# Patient Record
Sex: FEMALE | Race: WHITE | NOT HISPANIC OR LATINO | Employment: PART TIME | URBAN - METROPOLITAN AREA
[De-identification: names, ages, dates, MRNs, and addresses within clinical notes are randomized per-mention and may not be internally consistent; named-entity substitution may affect disease eponyms.]

---

## 2024-02-23 ENCOUNTER — OFFICE VISIT (OUTPATIENT)
Dept: ORTHOPEDIC SURGERY | Facility: CLINIC | Age: 74
End: 2024-02-23
Payer: MEDICARE

## 2024-02-23 VITALS
WEIGHT: 158.6 LBS | BODY MASS INDEX: 33.29 KG/M2 | SYSTOLIC BLOOD PRESSURE: 148 MMHG | DIASTOLIC BLOOD PRESSURE: 86 MMHG | HEIGHT: 58 IN

## 2024-02-23 DIAGNOSIS — M25.561 RIGHT KNEE PAIN, UNSPECIFIED CHRONICITY: ICD-10-CM

## 2024-02-23 DIAGNOSIS — M17.31 POST-TRAUMATIC OSTEOARTHRITIS OF RIGHT KNEE: Primary | ICD-10-CM

## 2024-02-23 DIAGNOSIS — Z72.0 TOBACCO ABUSE: ICD-10-CM

## 2024-02-23 PROCEDURE — 99204 OFFICE O/P NEW MOD 45 MIN: CPT | Performed by: ORTHOPAEDIC SURGERY

## 2024-02-23 RX ORDER — ATORVASTATIN CALCIUM 40 MG/1
TABLET, FILM COATED ORAL
COMMUNITY
Start: 2023-12-10

## 2024-02-23 RX ORDER — OXYBUTYNIN CHLORIDE 10 MG/1
TABLET, EXTENDED RELEASE ORAL
COMMUNITY
Start: 2023-12-10

## 2024-02-23 RX ORDER — ATENOLOL 100 MG/1
100 TABLET ORAL DAILY
COMMUNITY
Start: 2023-12-18

## 2024-02-23 RX ORDER — NAPROXEN 500 MG/1
TABLET ORAL
COMMUNITY
Start: 2023-09-28

## 2024-02-23 RX ORDER — TRAZODONE HYDROCHLORIDE 100 MG/1
TABLET ORAL
COMMUNITY
Start: 2023-12-02

## 2024-02-23 RX ORDER — CHOLECALCIFEROL (VITAMIN D3) 1250 MCG
1 CAPSULE ORAL WEEKLY
COMMUNITY
Start: 2023-12-23

## 2024-02-23 RX ORDER — HYDROCODONE BITARTRATE AND ACETAMINOPHEN 10; 325 MG/1; MG/1
1 TABLET ORAL TAKE AS DIRECTED
COMMUNITY
Start: 2024-01-24

## 2024-02-23 RX ORDER — CIPROFLOXACIN AND DEXAMETHASONE 3; 1 MG/ML; MG/ML
SUSPENSION/ DROPS AURICULAR (OTIC)
COMMUNITY
Start: 2024-01-24

## 2024-02-23 RX ORDER — LOSARTAN POTASSIUM 100 MG/1
100 TABLET ORAL DAILY
COMMUNITY
Start: 2023-12-18

## 2024-02-23 RX ORDER — FLUOXETINE HYDROCHLORIDE 40 MG/1
CAPSULE ORAL
COMMUNITY
Start: 2023-12-23

## 2024-02-23 RX ORDER — MELOXICAM 15 MG/1
1 TABLET ORAL DAILY
COMMUNITY
Start: 2023-12-08

## 2024-02-23 RX ORDER — AMLODIPINE BESYLATE 10 MG/1
10 TABLET ORAL DAILY
COMMUNITY
Start: 2023-12-23

## 2024-02-23 RX ORDER — LANSOPRAZOLE 30 MG/1
1 CAPSULE, DELAYED RELEASE ORAL DAILY
COMMUNITY
Start: 2023-11-17

## 2024-02-23 RX ORDER — FLUTICASONE PROPIONATE AND SALMETEROL XINAFOATE 230; 21 UG/1; UG/1
AEROSOL, METERED RESPIRATORY (INHALATION)
COMMUNITY
Start: 2023-09-08

## 2024-02-23 RX ORDER — ALBUTEROL SULFATE 90 UG/1
AEROSOL, METERED RESPIRATORY (INHALATION)
COMMUNITY
Start: 2023-09-08

## 2024-02-23 NOTE — PROGRESS NOTES
Orthopaedic Clinic Note: Knee New Patient    Chief Complaint   Patient presents with    Right Knee - Pain        HPI    Ladi Henriquez is a 73 y.o. female who presents with right knee pain for 4 year(s). Onset atraumatic and gradual in nature. Pain is localized to the entire knee/globally and is a 9/10 on the pain scale. Pain is described as aching. Associated symptoms include pain, swelling, popping, grinding, and stiffness. The pain is worse with walking, standing, sitting, climbing stairs, leisure, and rising from seated position; nothing make it better. Previous treatments have included: bracing, cane/walker, NSAIDS, physical therapy, viscosupplementation (last injection 4 years), and steroid injection (last injection N/A) .. Although some transient relief was reported with these interventions, these conservative measures have failed and symptoms have persisted. The patient is limited in daily activities and has had a significant decrease in quality of life as a result. She denies fevers, chills, or constitutional symptoms.  Patient does have pack-a-day smoker.    I have reviewed the following portions of the patient's history:History of Present Illness    Past Medical History:   Diagnosis Date    COPD (chronic obstructive pulmonary disease)     Kidney stones     Leg fracture, right     Stroke       Past Surgical History:   Procedure Laterality Date    KIDNEY STONE SURGERY      2x    LEG SURGERY      lower leg fracture      History reviewed. No pertinent family history.  Social History     Socioeconomic History    Marital status:    Tobacco Use    Smoking status: Every Day     Packs/day: 0.50     Years: 61.00     Additional pack years: 0.00     Total pack years: 30.50     Types: Cigarettes    Smokeless tobacco: Never   Vaping Use    Vaping Use: Former   Substance and Sexual Activity    Alcohol use: Defer    Drug use: Defer    Sexual activity: Defer      Current Outpatient Medications on File Prior to Visit    Medication Sig Dispense Refill    albuterol sulfate  (90 Base) MCG/ACT inhaler INHALE 2 PUFF(S) 4 TIMES A DAY BY INHALATION ROUTE AS NEEDED FOR 90 DAYS.      amLODIPine (NORVASC) 10 MG tablet Take 1 tablet by mouth Daily. for high blood pressure      atenolol (TENORMIN) 100 MG tablet Take 1 tablet by mouth Daily. for high blood pressure      atorvastatin (LIPITOR) 40 MG tablet take 1 tablet by mouth once daily at bedtime for 90 days      Cholecalciferol (Vitamin D3) 1.25 MG (71072 UT) capsule Take 1 capsule by mouth 1 (One) Time Per Week.      ciprofloxacin-dexAMETHasone (CIPRODEX) 0.3-0.1 % otic suspension INSTILL 4 DROPS INTO AFFECTED EAR(S) TWICE DAILY FOR 7 DAYS      FLUoxetine (PROzac) 40 MG capsule TAKE 1 CAPSULE BY MOUTH ONCE DAILY FOR DEPRESSION      fluticasone-salmeterol (Advair HFA) 230-21 MCG/ACT inhaler 2 puffs inhaled BID for asthma      HYDROcodone-acetaminophen (NORCO)  MG per tablet Take 1 tablet by mouth Take As Directed.      lansoprazole (PREVACID) 30 MG capsule Take 1 capsule by mouth Daily.      losartan (COZAAR) 100 MG tablet Take 1 tablet by mouth Daily. for high blood pressure      meloxicam (MOBIC) 15 MG tablet Take 1 tablet by mouth Daily.      naproxen (NAPROSYN) 500 MG tablet TAKE 1 TABLET BY MOUTH ONCE DAILY AS NEEDED FOR PAIN      oxybutynin XL (DITROPAN-XL) 10 MG 24 hr tablet TAKE 2 TABLETS BY MOUTH ONCE DAILY FOR BLADDER      traZODone (DESYREL) 100 MG tablet TAKE 1 TABLET BY MOUTH EVERY DAY AT BEDTIME FOR SLEEP       No current facility-administered medications on file prior to visit.      Allergies   Allergen Reactions    Codeine Nausea And Vomiting    Pravastatin Sodium Nausea And Vomiting    Prednisone Nausea And Vomiting        Review of Systems   Constitutional: Negative.    HENT: Negative.     Eyes: Negative.    Respiratory: Negative.     Cardiovascular: Negative.    Gastrointestinal: Negative.    Endocrine: Negative.    Genitourinary: Negative.   "  Musculoskeletal:  Positive for arthralgias.   Skin: Negative.    Allergic/Immunologic: Negative.    Neurological: Negative.    Hematological: Negative.    Psychiatric/Behavioral: Negative.          The patient's Review of Systems was personally reviewed and confirmed as accurate.    The following portions of the patient's history were reviewed and updated as appropriate: allergies, current medications, past family history, past medical history, past social history, past surgical history, and problem list.    Physical Exam  Blood pressure 148/86, height 147.3 cm (58\"), weight 71.9 kg (158 lb 9.6 oz).    Body mass index is 33.15 kg/m².    GENERAL APPEARANCE: awake, alert & oriented x 3, in no acute distress and well developed, well nourished  PSYCH: normal affect  LUNGS:  breathing nonlabored  EYES: sclera anicteric  CARDIOVASCULAR: palpable dorsalis pedis, palpable posterior tibial bilaterally. Capillary refill less than 2 seconds  EXTREMITIES: no clubbing, cyanosis  GAIT:  Antalgic            Right Lower Extremity Exam:   ----------  Hip Exam  ----------  FLEXION CONTRACTURE: None  FLEXION: 110 degrees  INTERNAL ROTATION: 20 degrees at 90 degrees of flexion   EXTERNAL ROTATION: 40 degrees at 90 degrees of flexion    PAIN WITH HIP MOTION: no  ----------  Knee Exam  ----------  ALIGNMENT: fixed varus, approximately 10 degrees varus    RANGE OF MOTION:  Decreased (10 - 100 degrees) with no extensor lag  LIGAMENTOUS STABILITY:   stable to varus and valgus stress at terminal extension and 30 degrees; retensioning of the MCL is appreciated with valgus stress at 30 degrees consistent with medial compartment degeneration     STRENGTH:  4/5 knee flexion, extension. 5/5 ankle dorsiflexion and plantarflexion.     PAIN WITH PALPATION: global  KNEE EFFUSION: yes, trace effusion  PAIN WITH KNEE ROM: yes, global  PATELLAR CREPITUS: yes, painful and symptomatic  SPECIAL EXAM FINDINGS:  none    REFLEXES:  PATELLAR 2+/4  ACHILLES " 2+/4    CLONUS: no  STRAIGHT LEG TEST:   negative    SENSATION TO LIGHT TOUCH:  DEEP PERONEAL/SUPERFICIAL PERONEAL/SURAL/SAPHENOUS/TIBIAL:   intact    EDEMA:  no  ERYTHEMA:  no  WOUNDS/INCISIONS:  no    ______________________________________________________________________  ______________________________________________________________________    RADIOGRAPHIC FINDINGS:   Indication: Right knee pain    Comparison: No prior xrays are available for comparison    Right knee(s) 4 views: Radiographs demonstrate severe arthritic changes with evidence of chronic medial tibial plateau fracture with extensive bone loss and genu varum alignment.    Lab studies from 10/26/2023 reveal CRP of 0.31, ESR of 24, white blood cell count of 13.1, creatinine 1.08 and GFR 53.    Assessment/Plan:   Diagnosis Plan   1. Post-traumatic osteoarthritis of right knee  Ambulatory Referral to Orthopedic Surgery      2. Right knee pain, unspecified chronicity  XR Knee 4+ View Right      3. Tobacco abuse          Patient has end-stage osteoarthritis of the right knee of posttraumatic nature.  Given the extensive bone loss, she will require extensive reconstructive surgery with likely revision components and cone placement to reconstruct the medial column of the knee.  Risks and benefits of surgery were discussed.  She is currently half a pack a day smoker and this places her at extreme risk of surgical complications with the extensive reconstruction required for this deformity.  As a result, I do not recommend surgical intervention unless she is able to cease nicotine intake.  Per her request, we will refer her to the [facility] for further evaluation and possible reconstruction.  She will follow-up with me as needed.    Josias Rudolph MD  02/23/24  11:37 EST